# Patient Record
(demographics unavailable — no encounter records)

---

## 2025-06-20 NOTE — PROCEDURE
[FreeTextEntry3] : Procedural Report Name: OLIMPIA BROWNING   MRN: [id number]  : Sep 26 1984  Requesting MD: MAXIMUS CHANG  Exam:  EVLT - Laser DOS: 2025  History: 40 year old F with insufficiency of right greater saphenous vein referred for endovenous laser ablation of the saphenous vein.  Anesthesia:  local only  Procedure time: 1hour  Procedure and Findings:  Informed consent was obtained from the patient prior to this procedure.  Continuous physiological monitoring of the patients vital signs was performed throughout the procedure.  The patient was brought into the procedure suite.  The patient was then placed supine on the procedure table and the leg prepped and draped in the usual sterile fashion.  The saphenous vein was accessed in the thigh under ultrasound guidance using a 21 gauge micropuncture needle.  The needle was exchanged over a 0.018 inch guide wire for a 4 Georgian tapered dilator.  After exchanging for a 0.035 inch guide wire, a five Georgian 45 cm long sheath was advanced to the saphenofemoral junction.  An [employer] laser fiber was then advanced through the sheath to the saphenofemoral junction.  Tumescent anesthesia using a .25% lidocaine solution was administered along the entire course of the vein under ultrasound guidance.  Compression of the vein was noted throughout its course.  After re-determining that the tip of the fiber was below the saphenofemoral junction, the laser was activated to 8 Romero energy and slowly withdrawn wit the sheath.  Total pullback time was approximately 128 seconds.  Total energy delivered was 1127 J.  Total vein length treated was 24 cm.  The laser was deactivated approximately 2 cm proximal to the puncture site.  The sheath and fiber was then removed.  Repeat ultrasound exam demonstrate no flow within the saphenous vein.  The common femoral vein remains patent.  A grade 2 thigh high compression stocking was then applied.  The patient tolerated the procedure without incident.  Impression:  Successful endovenous laser ablation of right greater saphenous vein as described above.

## 2025-07-03 NOTE — ASSESSMENT
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  The patient's veins have improved.  Her saphenous vein is closed on duplex exam performed today in my office.  I would like to see the patient back in my office in 3 months time or sooner if any new symptoms develop for possible micro stab phlebectomy.

## 2025-07-03 NOTE — HISTORY OF PRESENT ILLNESS
[FreeTextEntry1] : The patient is a 40-year-old female with symptomatic right leg varicose veins.  The patient underwent a right lower extremity greater saphenous vein ablation.  Today she presents for follow-up evaluation.  Her tributary veins have regressed and her symptoms have improved.

## 2025-07-03 NOTE — DATA REVIEWED
[FreeTextEntry1] : Venous duplex right lower extremity closed greater saphenous vein no evidence of DVT in the common femoral or deep femoral vein

## 2025-07-03 NOTE — REASON FOR VISIT
[Follow-Up: _____] : a [unfilled] follow-up visit [FreeTextEntry1] : Status post right leg saphenous vein ablation.